# Patient Record
Sex: MALE | Race: WHITE | Employment: OTHER | URBAN - METROPOLITAN AREA
[De-identification: names, ages, dates, MRNs, and addresses within clinical notes are randomized per-mention and may not be internally consistent; named-entity substitution may affect disease eponyms.]

---

## 2017-05-12 PROCEDURE — 84153 ASSAY OF PSA TOTAL: CPT | Performed by: INTERNAL MEDICINE

## 2017-05-24 PROBLEM — I27.20 PULMONARY HTN (HCC): Status: ACTIVE | Noted: 2017-05-24

## 2017-07-18 PROBLEM — G40.209 PARTIAL SYMPTOMATIC EPILEPSY WITH COMPLEX PARTIAL SEIZURES, NOT INTRACTABLE, WITHOUT STATUS EPILEPTICUS (HCC): Status: ACTIVE | Noted: 2017-07-18

## 2018-02-28 PROBLEM — F32.0 MILD SINGLE CURRENT EPISODE OF MAJOR DEPRESSIVE DISORDER (HCC): Status: ACTIVE | Noted: 2018-02-28

## 2018-03-07 PROBLEM — E88.09 HYPOALBUMINEMIA DUE TO PROTEIN-CALORIE MALNUTRITION (HCC): Status: ACTIVE | Noted: 2018-03-07

## 2018-03-07 PROBLEM — E46 HYPOALBUMINEMIA DUE TO PROTEIN-CALORIE MALNUTRITION (HCC): Status: ACTIVE | Noted: 2018-03-07

## 2018-03-07 PROCEDURE — 84153 ASSAY OF PSA TOTAL: CPT | Performed by: INTERNAL MEDICINE

## 2019-03-04 PROBLEM — E88.09 HYPOALBUMINEMIA DUE TO PROTEIN-CALORIE MALNUTRITION (HCC): Status: RESOLVED | Noted: 2018-03-07 | Resolved: 2019-03-04

## 2019-03-04 PROBLEM — E46 HYPOALBUMINEMIA DUE TO PROTEIN-CALORIE MALNUTRITION (HCC): Status: RESOLVED | Noted: 2018-03-07 | Resolved: 2019-03-04

## 2019-04-04 PROCEDURE — 81003 URINALYSIS AUTO W/O SCOPE: CPT | Performed by: INTERNAL MEDICINE

## 2019-05-31 PROBLEM — N18.30 CKD (CHRONIC KIDNEY DISEASE) STAGE 3, GFR 30-59 ML/MIN (HCC): Status: ACTIVE | Noted: 2019-05-31

## 2019-06-05 PROCEDURE — 88305 TISSUE EXAM BY PATHOLOGIST: CPT | Performed by: INTERNAL MEDICINE

## 2019-10-09 PROBLEM — F01.50 VASCULAR DEMENTIA WITHOUT BEHAVIORAL DISTURBANCE (HCC): Status: ACTIVE | Noted: 2019-10-09

## 2019-10-14 PROBLEM — D03.4 MELANOMA IN SITU OF NECK (HCC): Status: ACTIVE | Noted: 2019-10-14

## 2020-01-23 ENCOUNTER — HOSPITAL ENCOUNTER (OUTPATIENT)
Facility: HOSPITAL | Age: 75
Setting detail: OBSERVATION
Discharge: NURSING, REHAB OR LONG-TERM CARE | End: 2020-01-30
Attending: EMERGENCY MEDICINE | Admitting: INTERNAL MEDICINE
Payer: MEDICARE

## 2020-01-23 ENCOUNTER — APPOINTMENT (OUTPATIENT)
Dept: MRI IMAGING | Facility: HOSPITAL | Age: 75
End: 2020-01-23
Attending: EMERGENCY MEDICINE
Payer: MEDICARE

## 2020-01-23 DIAGNOSIS — Z91.81 AT HIGH RISK FOR FALLS: ICD-10-CM

## 2020-01-23 DIAGNOSIS — R26.81 UNSTEADY GAIT: Primary | ICD-10-CM

## 2020-01-23 PROCEDURE — 80053 COMPREHEN METABOLIC PANEL: CPT | Performed by: EMERGENCY MEDICINE

## 2020-01-23 PROCEDURE — 85025 COMPLETE CBC W/AUTO DIFF WBC: CPT | Performed by: EMERGENCY MEDICINE

## 2020-01-23 PROCEDURE — 99285 EMERGENCY DEPT VISIT HI MDM: CPT

## 2020-01-23 PROCEDURE — 86235 NUCLEAR ANTIGEN ANTIBODY: CPT | Performed by: OTHER

## 2020-01-23 PROCEDURE — 86618 LYME DISEASE ANTIBODY: CPT | Performed by: OTHER

## 2020-01-23 PROCEDURE — 93005 ELECTROCARDIOGRAM TRACING: CPT

## 2020-01-23 PROCEDURE — 70551 MRI BRAIN STEM W/O DYE: CPT | Performed by: EMERGENCY MEDICINE

## 2020-01-23 PROCEDURE — 83921 ORGANIC ACID SINGLE QUANT: CPT | Performed by: OTHER

## 2020-01-23 PROCEDURE — 86038 ANTINUCLEAR ANTIBODIES: CPT | Performed by: OTHER

## 2020-01-23 PROCEDURE — 82550 ASSAY OF CK (CPK): CPT | Performed by: OTHER

## 2020-01-23 PROCEDURE — 82607 VITAMIN B-12: CPT | Performed by: OTHER

## 2020-01-23 PROCEDURE — 81003 URINALYSIS AUTO W/O SCOPE: CPT | Performed by: EMERGENCY MEDICINE

## 2020-01-23 PROCEDURE — 93010 ELECTROCARDIOGRAM REPORT: CPT

## 2020-01-23 PROCEDURE — 86225 DNA ANTIBODY NATIVE: CPT | Performed by: OTHER

## 2020-01-23 PROCEDURE — 36415 COLL VENOUS BLD VENIPUNCTURE: CPT

## 2020-01-23 RX ORDER — HEPARIN SODIUM 5000 [USP'U]/ML
5000 INJECTION, SOLUTION INTRAVENOUS; SUBCUTANEOUS EVERY 8 HOURS SCHEDULED
Status: DISCONTINUED | OUTPATIENT
Start: 2020-01-23 | End: 2020-01-30

## 2020-01-23 RX ORDER — ACETAMINOPHEN 325 MG/1
650 TABLET ORAL EVERY 6 HOURS PRN
Status: DISCONTINUED | OUTPATIENT
Start: 2020-01-23 | End: 2020-01-30

## 2020-01-23 NOTE — ED INITIAL ASSESSMENT (HPI)
Pt with difficulty walking last 2 weeks, unsteady on feet. Deny fall. Pt had history of stroke 9 years ago.

## 2020-01-24 PROCEDURE — 97162 PT EVAL MOD COMPLEX 30 MIN: CPT

## 2020-01-24 PROCEDURE — 83921 ORGANIC ACID SINGLE QUANT: CPT | Performed by: OTHER

## 2020-01-24 PROCEDURE — 97116 GAIT TRAINING THERAPY: CPT

## 2020-01-24 PROCEDURE — 85652 RBC SED RATE AUTOMATED: CPT | Performed by: OTHER

## 2020-01-24 RX ORDER — LOSARTAN POTASSIUM 100 MG/1
100 TABLET ORAL DAILY
Status: DISCONTINUED | OUTPATIENT
Start: 2020-01-24 | End: 2020-01-30

## 2020-01-24 RX ORDER — ASPIRIN 81 MG/1
81 TABLET ORAL DAILY
Status: DISCONTINUED | OUTPATIENT
Start: 2020-01-24 | End: 2020-01-30

## 2020-01-24 NOTE — H&P
[facility] Hospitalist History and Physical      Patient presents with:  Neurologic Problem       PCP: Henok Kruger MD      History of Present Illness: Patient is a 69year old male with PMH sig for CVA with residual L sided weakness, vascular dementia,  HTN, [address]   • MELIOLABIAL  FLAP CLOSURE Left 5/14/2013    Performed by Rebecca Lin MD at [address] [facility] AND SCULPTING N/A 10/1/2013    Performed by Rebecca Lin MD at [address] [place] [address] °C) (Oral)   Resp 18   Ht 6' 2\" (1.88 m)   Wt 200 lb (90.7 kg)   SpO2 94%   BMI 25.68 kg/m²   Gen: No acute distress, alert and oriented x3, no focal neurologic deficits  HEENT:  EOMI, PERRLA, OP clear, MMM  Pulm: Lungs clear bilaterally, normal respirato occipital lobe consistent with prior infarct/insult with parenchymal loss. There is corresponding ex vacuo dilatation of the occipital horn of the lateral ventricle. Expected signal is seen in the visualized paranasal sinuses and mastoid air cells.

## 2020-01-24 NOTE — PLAN OF CARE
Pt admitted to room 7603. With wife and son at bedside. All questions answered plan of care reviewed. Vss, pt voiding, neuro consult for tomorrow as well as PT.  Will continue to follow

## 2020-01-24 NOTE — PLAN OF CARE
Problem: Patient/Family Goals  Goal: Patient/Family Long Term Goal  Description  Patient's Long Term Goal:     Interventions:  - See additional Care Plan goals for specific interventions  Outcome: Progressing  Goal: Patient/Family Short Term Goal  Descri and encourage patient/family in tolerated functional activity level and precautions during self-care  Outcome: Progressing     Problem: SAFETY ADULT - FALL  Goal: Free from fall injury  Description  INTERVENTIONS:  - Assess pt frequently for physical needs

## 2020-01-24 NOTE — ED PROVIDER NOTES
Patient Seen in: [facility] Emergency Department      History   Patient presents with:  Neurologic Problem    Stated Complaint: gait issues for several weeks, no mentation issues    HPI    The patient is a 46-year-old male with a history of a CVA ab POSSIBLE POLYPECTOMY 74695 N/A 2/27/2009    Performed by Hadley Snellen, MD at [address] GRAFT Left 5/14/2013    Performed by Juan Celaya MD at [address] [facility] Left 5/14/20 slurred speech. CN II-XII intact. Grossly normal and symmetric motor strength and sensation proximally and distally throughout 4 extremities.   Patient has a very unstable gait, as soon as I stand him up, he only is able to make 1 or 2 strides, dragging hi DIFFERENTIAL WITH PLATELET    Narrative: The following orders were created for panel order CBC WITH DIFFERENTIAL WITH PLATELET.   Procedure                               Abnormality         Status                     --------- basilar tip. There is high FLAIR and T2 weighted signal in deep white matter     consistent with moderate chronic small vessel disease. There is no     restricted diffusion.            There is an area of signal change involving the right occipit

## 2020-01-24 NOTE — CM/SW NOTE
Pt is a 68 yo male admitted for unsteady gait. Pt lives with his wife Iesha Mccarthy in a two-story home. He has 4 children - one dtr living locally. Pt has been walking with a walker. Pt has some memory deficits. Pt has a history of a stroke 9 years ago.   Pt

## 2020-01-24 NOTE — CONSULTS
[facility]  Neurology  Hospital Consultation Report    Moisés Urbina Patient Status:  Observation    1945 MRN [id number]   [facility] 7NE-A Attending Bishnu Brock, DO   Hosp Day # 0 PCP Andi Ely MD   Date of Adm 1/07 0.8; 6/05 0.7   • Mild single current episode of major depressive disorder ([facility] [address] 2/28/2018   • Other and unspecified hyperlipidemia    • STROKE 1/11    loss of left peripheral vision   • Unspecified essential hypertension    • Vascular dementia withou Smokeless tobacco: Never Used      Tobacco comment: smoked as a teenager    Alcohol use:  Yes      Alcohol/week: 0.0 standard drinks      Comment: occasional wine    Drug use: No         Current Medications:  aspirin EC tab 81 mg, 81 mg, Oral, Daily  chela bright and well composed. Head: Normocephalic, atraumatic. Eyes: anicteric  ENT: normal tongue, normal mucosa. Neck: supple  Lymph Nodes:  No adenopathy  Cardiovascular:   Regular rate and rhythm. Widened QRS on telemetry.   Skin: No evidence for rash Dictated by: Barbara Anderson MD on 1/23/2020 at 18:57     Approved by: Barbara Anderson MD on 1/23/2020 at 19:00          Pertinent Labs and Imaging: Reviewed.     Impression:     Unsteady gait  Likely multifactorial.  The patient has moderately severe small vessel were answered. Understanding of the information was demonstrated. Further recommendations and impressions will follow repeat examinations and review of pertinent data.  The reader is asked to contact this examiner following review the report question or

## 2020-01-24 NOTE — CONSULTS
Chart reviewed  Seen previously by Dr. Clarice Garza. No DWI abnormalities on MRI to suggest acute stroke. Chronic and severe cerebrovascular disease found on MRI brain. Blood testing added.   Recommend EMG  PT ordered  Full note to follow    Michelle Johnston MD

## 2020-01-24 NOTE — CONSULTS
Carlyle Patient Status:  Observation    1945 MRN [id number]   [facility] 7NE-A Attending Troy Almonte DO   Hosp Day # 0 PCP Edith Shelton MD     Patient Identification  Rohan Buckley is a 69 year ol They are recommending additional work-up as an outpatient. He was seen by physical therapy and unsteady with gait thus rehab consult requested. Physiatry consult obtained now to assess pt's funtional status and make appropriate recommendations.     Pavan MELIOLABIAL  FLAP CLOSURE Left 5/14/2013    Performed by Mode Wynn MD at [address] [facility] AND SCULPTING N/A 10/1/2013    Performed by Mode Wynn MD at [facility] 1623 01/24/2020     01/23/2020    B12 333 01/23/2020       Review of Systems:  Complete review systems done and negative except as that outlined in HPI.   He somewhat unreliable for review of systems given cognitive impairment    OBJECTIVE:    Blood pressu Psychiatric: Awake, alert and oriented x 2. Able to register and recall 0/3                                       items. Flat affect. Delayed cognitive processing. Hard of hearing. He does have a left visual field cut as well.   There

## 2020-01-24 NOTE — PHYSICAL THERAPY NOTE
PHYSICAL THERAPY EVALUATION - INPATIENT     Room Number: 8340/8754-A  Evaluation Date: 1/24/2020  Type of Evaluation: Initial  Physician Order: PT Eval and Treat    Presenting Problem: worsening gait instability  Reason for Therapy: Mobility Dysfunct COLONOSCOPY, POSSIBLE BIOPSY, POSSIBLE POLYPECTOMY 39888 N/A 7/16/2014    Performed by Kb Anne MD at [place] [address]., POSSIBLE POLYPECTOMY 01387 N/A 2/27/2009    Performed by Kb Anne MD at [facility] vision(left visual field cut)  Fall Risk: High fall risk    WEIGHT BEARING RESTRICTION  Weight Bearing Restriction: None                PAIN ASSESSMENT  Ratin  Location: denies pain       COGNITION  · Safety Judgement:  decreased awareness of need for Device: Rolling walker  Pattern: L Decreased stance time; Shuffle;L Foot drag;Ataxic  Stoop/Curb Assistance: Not tested  Comment : Score based on FIM definitions per department protocol. Skilled Therapy Provided:   Session approved by RN.  Pt received sup wife, HTN, vascular dementia. In this PT evaluation, the patient presents with the following impairments decreased activity tolerance, decreased functional strength, impaired balance, impaired motor planning, impaired safety awareness with rolling walker.

## 2020-01-25 PROCEDURE — 84425 ASSAY OF VITAMIN B-1: CPT | Performed by: OTHER

## 2020-01-25 PROCEDURE — 85014 HEMATOCRIT: CPT | Performed by: OTHER

## 2020-01-25 PROCEDURE — 82747 ASSAY OF FOLIC ACID RBC: CPT | Performed by: OTHER

## 2020-01-25 PROCEDURE — 97116 GAIT TRAINING THERAPY: CPT

## 2020-01-25 PROCEDURE — 97112 NEUROMUSCULAR REEDUCATION: CPT

## 2020-01-25 PROCEDURE — 97110 THERAPEUTIC EXERCISES: CPT

## 2020-01-25 NOTE — CM/SW NOTE
Spoke with spouse regarding ODETTE. [facility] and [facility] @  per spouse request.  Explained two step process-clinical acceptance in network with insurance and then insurance auth.

## 2020-01-25 NOTE — PLAN OF CARE
Assumed care at 0700. Pt A/O x3. [place]. RA. NSR on tele. VSS. L field cut and L sided ataxia noted. Denies any pain. Rheumatology (Dr. Lesia Oropeza) paged with new consult. Up x2 with the walker and gait belt.  Walked in the wolff x1 with nursing staff and then with P activity/tolerance for mobility and gait  - Educate and engage patient/family in tolerated activity level and precautions  - Recommend use of  RW for transfers and ambulation   Outcome: Progressing     Problem: Impaired Activities of Daily Living  Goal: Ac Department for coordinating discharge planning if the patient needs post-hospital services based on physician/LIP order or complex needs related to functional status, cognitive ability or social support system  Outcome: Progressing     Problem: Salvatore Pabon

## 2020-01-25 NOTE — PHYSICAL THERAPY NOTE
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 Skilled Therapy Provided: The pt participated in coordination training of LLE, strength training with dat le's, balance training to improve dynamic balance, transfer training and gait training.  Pt's wife was educated on coordination ex to [employer] rehabilitation     PLAN  PT Treatment Plan: Bed mobility; Coordination; Endurance; Energy conservation;Patient education; Family education;Gait training;Neuromuscular re-educate;Stair training;Transfer training;Balance training  Rehab Potential : Alesha Shannon

## 2020-01-25 NOTE — PLAN OF CARE
Pt care assumed this am  Awake, alert and oriented x 3- disoriented to year and date. Baseline- Hard of hearing with left visual field cut.  strength 5/5 upper arms with some ataxic discordinated movement with left finger-nose and left lower extremity.   Ev

## 2020-01-25 NOTE — CM/SW NOTE
Patient seen by dr Meena Murphy with acute rehab--recommendation of ODETTE noted--to follow up with patient

## 2020-01-25 NOTE — PROGRESS NOTES
Chad Lynch Patient Status:  Observation    1945 MRN [id number]   [facility] 7NE-A Attending Debbie Soler DO   Hosp Day # 0 PCP Christine Patterson MD   Date of Admissi [address] [facility] [address]   • EAR CARTILAGE HARVEST GRAFT Left 5/14/2013    Performed by Emmanuel Browne MD at [address]   • [facility] Left 5/14/2013    Performed by Emmanuel Browne MD at [address] dosage unknown  COQ10 OR, dosage unknown      Allergies    Pcn [Bicillin L-A]          Comment:Does not know reaction  Review of Systems:   Review of Systems:  GENERAL: feels well otherwise  SKIN: denies any unusual skin lesions or rashes  EYES: denies scott diminished bilaterally, some neglect of the left. Coordination:  There is no cerebellar dystaxia, although the arms are clumsy.     Results:   Laboratory Data:  Lab Results   Component Value Date    WBC 6.9 01/23/2020    HGB 14.3 01/23/2020    HCT 41.8 01/ prophylaxis. This will be deferred to the hospitalist team and primary care physician. At high risk for falls  As above. Risk will continue.       Positive autoantibodies  Per hospitalist team    I've explained these findings and impressions to the pa

## 2020-01-25 NOTE — PROGRESS NOTES
[facility] Hospitalist Progress Note                                                                   Natalie  5/18/1945    SUBJECTIVE:  Pt seen and examined.   No new complaints, denies annie recommend ODETTE  - unclear significance of \"ex vacuo dilation of the occipital horn of the lateral ventrical.\"  Symptoms unlikely due hydrocephalus per neuro.    - await decision about ODETTE placement     #Hx of CVA with residual L sided weakness  - cont baby

## 2020-01-25 NOTE — PLAN OF CARE
Assumed care of pt 1930. Spouse and son at bedside. Aox3, unsure of date. SR per tele. Up x1 assist with walker. Ambulated in wolff. Gait unsteady. Shuffling gait. Denies pain. POC updated with pt and family. Sleeping comfortably, will continue to monitor. and precautions  - Recommend use of  RW for transfers and ambulation   Outcome: Progressing     Problem: Impaired Activities of Daily Living  Goal: Achieve highest/safest level of independence in self care  Description  Interventions:  - Assess ability and order or complex needs related to functional status, cognitive ability or social support system  Outcome: Progressing     Problem: NEUROLOGICAL - ADULT  Goal: Achieves stable or improved neurological status  Description  INTERVENTIONS  - Assess for and rep

## 2020-01-25 NOTE — CONSULTS
Consult Requested By:  Dr. Lopez ref.  provider found                Chief Complaint:  Patient presents with:  Neurologic Problem              HPI: The patient, a 69year old male with a PMH of stroke with left sided weakness, vascular dementia, HTN, major dep POSSIBLE BIOPSY, POSSIBLE POLYPECTOMY 36667 N/A 7/16/2014    Performed by Zenia Harman MD at [place] [address]., POSSIBLE POLYPECTOMY 46949 N/A 2/27/2009    Performed by Zenia Harman MD at [address] [place] [address] distress  Head: Normocephalic   Eyes:  sclera-clear  NMT: mucus membranes moist  Neck:  No adenopathy     CVS: S1+S2+, RRR   Chest: Clear to auscultation. No crackles, rhonchi or wheezes.   Abd:    Ext: no edema, cyanosis or clubbing   Skin: Unremarkable CRP  - his ESR, UA, CBC/CMP and UA with just mild abnormalities that are nonspecific  - check Aldolase    2. Gait instability- no proximal weakness on exam.  CK negative.    - will await NCS results  - brain MRI no new ischemic disease   - given normal CK a

## 2020-01-26 PROCEDURE — 97166 OT EVAL MOD COMPLEX 45 MIN: CPT

## 2020-01-26 PROCEDURE — 82085 ASSAY OF ALDOLASE: CPT | Performed by: INTERNAL MEDICINE

## 2020-01-26 PROCEDURE — 86160 COMPLEMENT ANTIGEN: CPT | Performed by: INTERNAL MEDICINE

## 2020-01-26 PROCEDURE — 86140 C-REACTIVE PROTEIN: CPT | Performed by: INTERNAL MEDICINE

## 2020-01-26 PROCEDURE — 80061 LIPID PANEL: CPT | Performed by: HOSPITALIST

## 2020-01-26 PROCEDURE — 97530 THERAPEUTIC ACTIVITIES: CPT

## 2020-01-26 NOTE — OCCUPATIONAL THERAPY NOTE
OCCUPATIONAL THERAPY EVALUATION - INPATIENT     Room Number: 3076/3905-V  Evaluation Date: 1/26/2020  Type of Evaluation: Initial  Presenting Problem: Unsteady gait    Physician Order: IP Consult to Occupational Therapy  Reason for Therapy: ADL/IADL Dysfun COLONOSCOPY, POSSIBLE BIOPSY, POSSIBLE POLYPECTOMY 60484 N/A 7/16/2014    Performed by Leroy Pandya MD at [place] [address] POSSIBLE POLYPECTOMY 49034 N/A 2/27/2009    Performed by Leroy Pandya MD at [facility] ASSESSMENT  Ratin          COGNITION  Arousal/Alertness:  lethargic  Attention Span:  attends with cues to redirect  Motor Planning: impaired    VISION  Peripheral:  impaired        RANGE OF MOTION AND STRENGTH ASSESSMENT  Upper extremity ROM and stren completed include IP AMPAC, MMT and ROM.  In this OT evaluation patient presents with the following performance deficits: decreased strength and balance, decreased endurance, decreased knowledge of adaptive techniques, decreased knowledge of safety precauti with supervision and while at edge of bed  Patient will perform toileting: with supervision    Functional Transfer Goals  Patient will transfer from sit to stand:  with supervision  Patient will transfer to toilet:  with supervision

## 2020-01-26 NOTE — PROGRESS NOTES
Chart reviewed. If patient is still in the hospital tomorrow, and if available, there may be some benefit in having electrodiagnostic testing done as an inpatient.   This will be provided by the [facility] neurology team.  Otherwise, the patient may go to [facility]

## 2020-01-26 NOTE — PROGRESS NOTES
[facility] Hospitalist Progress Note                                                                   Natalie  5/18/1945    SUBJECTIVE:  Pt seen and examined.   No new complaints, denies annie EMG   - PT and PMR eval appreciated, recommend ODETTE  - unclear significance of \"ex vacuo dilation of the occipital horn of the lateral ventrical.\"  Symptoms unlikely due hydrocephalus per neuro.    - await decision about ODETTE placement     #Positive KENAN  -

## 2020-01-26 NOTE — PLAN OF CARE
Assumed care at 0700. Pt A/O x3. [place]. RA. NSR on tele. VSS. L field cut and L sided ataxia noted. Denies any pain. Up x2 with the walker and gait belt. Walked in the wolff with nursing staff x1. Pt and pt family updated on POC. Call light within reach.  Will tolerated activity level and precautions  - Recommend use of  RW for transfers and ambulation   Outcome: Progressing     Problem: Impaired Activities of Daily Living  Goal: Achieve highest/safest level of independence in self care  Description  Interventio services based on physician/LIP order or complex needs related to functional status, cognitive ability or social support system  Outcome: Progressing     Problem: NEUROLOGICAL - ADULT  Goal: Achieves stable or improved neurological status  Description  INT

## 2020-01-26 NOTE — PLAN OF CARE
Assumed care of pt 1930. Aox3. Ambulated in hallway x2 assist with walker and gait belt. Gait unsteady. Denies pain. SR per tele. RA, lungs clear. Sleeping comfortably, will continue to monitor.       Problem: Patient/Family Goals  Goal: Patient/Family Long ambulation   Outcome: Progressing     Problem: Impaired Activities of Daily Living  Goal: Achieve highest/safest level of independence in self care  Description  Interventions:  - Assess ability and encourage patient to participate in ADLs to maximize func cognitive ability or social support system  Outcome: Progressing     Problem: NEUROLOGICAL - ADULT  Goal: Achieves stable or improved neurological status  Description  INTERVENTIONS  - Assess for and report changes in neurological status  - Initiate measur

## 2020-01-27 PROCEDURE — 97116 GAIT TRAINING THERAPY: CPT

## 2020-01-27 PROCEDURE — 80048 BASIC METABOLIC PNL TOTAL CA: CPT | Performed by: HOSPITALIST

## 2020-01-27 PROCEDURE — 85025 COMPLETE CBC W/AUTO DIFF WBC: CPT | Performed by: HOSPITALIST

## 2020-01-27 PROCEDURE — 97112 NEUROMUSCULAR REEDUCATION: CPT

## 2020-01-27 NOTE — PLAN OF CARE
Assumed care at [address] and oriented x4, forgetful  L sided weakness, L field cut & L sided ataxia noted, residual from old CVA  Currently on RA, spO2 90's  Denies pain at this time  Plan is to find ODETTE placement, multiple referrals already sent  Also, activity/tolerance for mobility and gait  - Educate and engage patient/family in tolerated activity level and precautions  - Recommend use of  RW for transfers and ambulation   Outcome: Progressing     Problem: Impaired Activities of Daily Living  Goal: Ac Department for coordinating discharge planning if the patient needs post-hospital services based on physician/LIP order or complex needs related to functional status, cognitive ability or social support system  Outcome: Progressing     Problem: James Beltran

## 2020-01-27 NOTE — PHYSICAL THERAPY NOTE
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 Skilled Therapy Provided: The pt participated in coordination training of LLE, strength training with dat le's, balance training to improve dynamic balance, transfer training and gait training.    Pt ambulated with RW and min assist of one, mod Daily    CURRENT GOALS        Goal #1 Patient is able to demonstrate supine - sit EOB @ level: modified independent      Goal #2 Patient is able to demonstrate transfers Sit to/from Stand at assistance level: modified independent      Goal #3 Patient is ab

## 2020-01-27 NOTE — PROGRESS NOTES
[facility] hospitalist daily note  Patient was seen/examined on 1/27/20    S; no chest pain, no SOB, no abd pain, no nausea/emesis  No bleeding    Medications in Epic    PE    01/27/20  1200   BP:    Pulse:    Resp:    Temp: 98.1 °F (36.7 °C)     Gen: awake, aler

## 2020-01-27 NOTE — CM/SW NOTE
[facility] can accept pt medically; however, wife has decided she would like pt to go to [facility] for ODETTE. Called and asked Steffi Jules at [facility] to submit for insurance auth. SW to f/u with pt's d/c to [employer] auth.

## 2020-01-27 NOTE — PROGRESS NOTES
Subjective     No overnight events    • aspirin  81 mg Oral Daily   • Losartan Potassium  100 mg Oral Daily   • Sertraline HCl  50 mg Oral Nightly   • Heparin Sodium (Porcine)  5,000 Units Subcutaneous Q8H [address]     Objective     /81 (BP Location: Left

## 2020-01-27 NOTE — CM/SW NOTE
[facility] pt for [facility], [facility] cannot accept, have not heard back from [facility] or [facility]. Wife would like an additional referral sent to [facility] in [place] since it is close to their home.   Referral sent via ecin - waiting for a respon

## 2020-01-27 NOTE — PLAN OF CARE
Assumed care of patient at 0700  A/OX3-4, RA, NSR on tele  Denies pain  PT rec acute rehab  Patient unable to receive EMG today d/t no [facility] neurologist available.  Patient and family agreed to EMG outpatient  Patient and family decided on [place] for rehab

## 2020-01-28 PROCEDURE — 97116 GAIT TRAINING THERAPY: CPT

## 2020-01-28 PROCEDURE — 97535 SELF CARE MNGMENT TRAINING: CPT

## 2020-01-28 PROCEDURE — 97110 THERAPEUTIC EXERCISES: CPT

## 2020-01-28 RX ORDER — ATORVASTATIN CALCIUM 10 MG/1
10 TABLET, FILM COATED ORAL NIGHTLY
Status: DISCONTINUED | OUTPATIENT
Start: 2020-01-28 | End: 2020-01-30

## 2020-01-28 NOTE — PHYSICAL THERAPY NOTE
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 Skilled Therapy Provided: The pt participated in coordination training of LLE, strength training with dat le's, balance training to improve dynamic balance, transfer training and gait training.    Pt ambulated with RW and min assist of one for mobility; Coordination; Endurance; Energy conservation;Patient education; Family education;Gait training;Neuromuscular re-educate;Stair training;Transfer training;Balance training  Rehab Potential : Good  Frequency (Obs): Daily    CURRENT GOALS        Goal #1

## 2020-01-28 NOTE — OCCUPATIONAL THERAPY NOTE
OCCUPATIONAL THERAPY TREATMENT NOTE - INPATIENT     Room Number: 5465/4422-Z  Session: 1   Number of Visits to Meet Established Goals: 5    Presenting Problem: Unsteady gait    History related to current admission:  Pt is 69year old male admitted on 1/23/ [facility]   • COLONOSCOPY, POSSIBLE BIOPSY, POSSIBLE POLYPECTOMY 39307 N/A 2/27/2009    Performed by Chloé Castro MD at [address] [facility] [address] GRAFT Left 5/14/2013    Performed by Nathalia Wright MD at [place] [address] Pt was completing PT session. When standing with RW, min A for dynamic balance, but without R hand support on the walker, pt could not weight shift R LE off the floor. Refer to PT note. L LE weakness noted.   Per pt, low vision since the old CVA, but he

## 2020-01-28 NOTE — PLAN OF CARE
Assumed care at 0700. Pt A/O x2-3. Neurologically stable. RA. NSR on tele. VSS. Denies any pain. Up x1 with the walker and a gait belt. Waiting on insurance approval for discharge to [address] [place] [address]. Pt and pt family updated on POC. Call light within reach.  Will tolerated activity level and precautions  - Recommend use of  RW for transfers and ambulation   Outcome: Progressing     Problem: Impaired Activities of Daily Living  Goal: Achieve highest/safest level of independence in self care  Description  [facility] services based on physician/LIP order or complex needs related to functional status, cognitive ability or social support system  Outcome: Progressing     Problem: NEUROLOGICAL - ADULT  Goal: Achieves stable or improved neurological status  Description  INT

## 2020-01-28 NOTE — CM/SW NOTE
Per [facility], they have not ye received ins auth. [facility] is not managing this referral auth.  They transferred the call to [address] [place] [address] but was unable to reach a department or a human who could [address] [facility]

## 2020-01-29 PROCEDURE — 97116 GAIT TRAINING THERAPY: CPT

## 2020-01-29 PROCEDURE — 97110 THERAPEUTIC EXERCISES: CPT

## 2020-01-29 PROCEDURE — 97535 SELF CARE MNGMENT TRAINING: CPT

## 2020-01-29 RX ORDER — ATORVASTATIN CALCIUM 10 MG/1
10 TABLET, FILM COATED ORAL NIGHTLY
Qty: 30 TABLET | Refills: 0 | Status: SHIPPED | OUTPATIENT
Start: 2020-01-29 | End: 2020-06-04

## 2020-01-29 NOTE — PROGRESS NOTES
[facility] hospitalist daily note  Patient was seen/examined on 1/28/20     S; no chest pain, no SOB, no abd pain, no nausea/emesis  No bleeding     Medications in Epic     PE    01/28/20  1643   BP: 110/56   Pulse: 70   Resp: 18   Temp: 97.9 °F (36.6 °C)     [facility]  [phone]

## 2020-01-29 NOTE — OCCUPATIONAL THERAPY NOTE
OCCUPATIONAL THERAPY TREATMENT NOTE - INPATIENT     Room Number: 8664/9852-O  Session: 2   Number of Visits to Meet Established Goals: 5    Presenting Problem: Unsteady gait    History related to current admission:  Pt is 69year old male admitted on 1/23/ [facility], [facility]   • COLONOSCOPY, POSSIBLE BIOPSY, POSSIBLE POLYPECTOMY [address]76 N/A 2/27/2009    Performed by Kike Don MD at [address] [place] [address] GRAFT Left 5/14/2013    Performed by Shahnaz Zeng MD at [facility] ASSESSMENT  Supine to Sit : Not tested  Sit to Stand: Minimum assistance    Skilled Therapy Provided: Pt was working with PT. Min A to stand and to ambulate to sink counter. Required increased time and tactile cueing to advance L LE.   Pt completed facial dressing:  with supervision and while at edge of bed  Patient will perform toileting: with supervision     Functional Transfer Goals  Patient will transfer from sit to stand:  with supervision  Patient will transfer to toilet:  with supervision

## 2020-01-29 NOTE — DISCHARGE SUMMARY
General Medicine Discharge Summary     Patient ID:  Lucy Steiner  69year old  5/18/1945    Admit date: 1/23/2020    Discharge date and time: 1/29/20    Attending Physician: Jordan Mirza MD     Primary Care Physician: Ju Chamberlain MD     Reason fo mg total) by mouth daily. Sertraline HCl 50 MG Oral Tab  Take 1 tablet (50 mg total) by mouth daily. aspirin 81 MG Oral Tab  Take 81 mg by mouth daily.     SM OMEGA-3-6-9 FATTY ACIDS OR  dosage unknown    COQ10 OR  dosage unknown      STOP taking thes

## 2020-01-29 NOTE — PLAN OF CARE
Assumed care 1900. Pt A&Ox3. VSS. Neuros q shift obtained. No acute events overnight. Denies pain. Up ambulating in halls and bathroom with walker and 1 assist. Tolerated fairly well. Pt updated on POC. Monitoring.    Waiting for insurance approval

## 2020-01-29 NOTE — PHYSICAL THERAPY NOTE
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 Skilled Therapy Provided: The pt participated on endurance training ( stationary restorator x 9 min), strength training with dat heather's, balance training to improve dynamic balance, transfer training and gait training.  Pt able to stand with clos training;Neuromuscular re-educate;Stair training;Transfer training;Balance training  Rehab Potential : Good  Frequency (Obs): Daily    CURRENT GOALS        Goal #1 Patient is able to demonstrate supine - sit EOB @ level: modified independent      Goal #2 P

## 2020-01-29 NOTE — DIETARY NOTE
[address]     Admitting diagnosis:  Unsteady gait [R26.81]  At high risk for falls [Z91.81]    Ht: 188 cm (6' 2\")  Wt: 86.6 kg (191 lb). This is 100 % of IBW  Body mass index is 24.52 kg/m².   IBW: 86.4 kg    La

## 2020-01-29 NOTE — CM/SW NOTE
Jerome Rocha still does not have auth from the insurance medical director.  Rosa Maria Santana is in the office until just after 4pm. She does not have the information about who may be in the office after 4pm.  Spoke with 2 other representatives and finally LM for manager, Natalie Malagon

## 2020-01-29 NOTE — PROGRESS NOTES
Letter sent to MA pool with results and MD notes  Nothing further needed from MD or nurse. Closing encounter.

## 2020-01-29 NOTE — PLAN OF CARE
Assumed care at 0700. Pt A/O x3. RA. NSR on tele. VSS. All consults cleared for discharge. Waiting for insurance approval for rehab. Plan to go to either [facility] or [facility]. Probably d/c tomorrow. Pt and pt family updated on POC. Call light within reach. stability  - Promote increasing activity/tolerance for mobility and gait  - Educate and engage patient/family in tolerated activity level and precautions  - Recommend use of  RW for transfers and ambulation   Outcome: Adequate for Discharge     Problem: Im responsible for managing their own health  - Refer to Case Management Department for coordinating discharge planning if the patient needs post-hospital services based on physician/LIP order or complex needs related to functional status, cognitive ability o

## 2020-01-29 NOTE — CM/SW NOTE
[facility] take [facility] pt today. They both have bed availability. We are now just waiting for Alisha to give auth for [facility]ETTE vides. Family will provide transportation via private car.      / to remain [employer]

## 2020-01-29 NOTE — CM/SW NOTE
Elizabeth García is the entity that manages the auth for this pt to go to [facility]. [facility] is not a preferred provider. Elizabeth García / Sanjuana Orta [phone] is having the medical directory review this case to request auth for ODETTE and check if the pt can get auth go to [facility].

## 2020-01-30 VITALS
HEIGHT: 74 IN | SYSTOLIC BLOOD PRESSURE: 129 MMHG | TEMPERATURE: 98 F | OXYGEN SATURATION: 96 % | HEART RATE: 63 BPM | BODY MASS INDEX: 24.51 KG/M2 | DIASTOLIC BLOOD PRESSURE: 75 MMHG | WEIGHT: 191 LBS | RESPIRATION RATE: 12 BRPM

## 2020-01-30 PROCEDURE — 97110 THERAPEUTIC EXERCISES: CPT

## 2020-01-30 PROCEDURE — 97535 SELF CARE MNGMENT TRAINING: CPT

## 2020-01-30 PROCEDURE — 97116 GAIT TRAINING THERAPY: CPT

## 2020-01-30 NOTE — PLAN OF CARE
NURSING DISCHARGE NOTE    Discharged Rehab facility via Wheelchair. Accompanied by Family member and Spouse  Belongings Taken by patient/family.     Received patient A/Ox3, SHERRY, NSR on tele at 0700  Patient ambulated with PT/OT  Neuro check q shift, see Mobility  Goal: Achieve highest/safest level of mobility/gait  Description  Interventions:  - Assess patient's functional ability and stability  - Promote increasing activity/tolerance for mobility and gait  - Educate and engage patient/family in tolerated post-discharge preferences of patient/family/discharge partner  - Complete POLST form as appropriate  - Assess patient's ability to be responsible for managing their own health  - Refer to Case Management Department for coordinating discharge planning if t

## 2020-01-30 NOTE — CM/SW NOTE
01/30/20 1300   Discharge disposition   Expected discharge disposition Skilled Nurs   Name of Facillity/Home [address] [place] [address]   Patient is Discharged to a [address] [place] [address] Yes   Discharge transportation Private car

## 2020-01-30 NOTE — PLAN OF CARE
Assumed care at [address]. Pt a/ox3, forgetful. VSS. NSR per tele. RA. Denies pain. Assisted to [facility] x1 assist w/ walker. Bed alarm on for safety. Pt updated w/ POC. Will continue to monitor. Pending insurance to [address] [place] [address] or [place].

## 2020-01-30 NOTE — CM/SW NOTE
Spoke with Crista Castro from [facility] to confirm d/c time. Pt's wife will drive pt to [facility] today at 12:30pm.  RN to call report to [phone].

## 2020-01-30 NOTE — DISCHARGE SUMMARY
General Medicine Discharge Summary     Patient ID:  Marissa Dean  69year old  5/18/1945    Admit date: 1/23/2020    Discharge date and time: 1/30/20    Attending Physician: Lavell Mcqueen MD     Primary Care Physician: Jaya Urias MD     Reason fo List    START taking these medications    atorvastatin 10 MG Oral Tab  Take 1 tablet (10 mg total) by mouth nightly. CONTINUE these medications which have NOT CHANGED    Irbesartan 300 MG Oral Tab  Take 1 tablet (300 mg total) by mouth daily.     Dimitriosr

## 2020-01-30 NOTE — CM/SW NOTE
Call placed to miko at [facility]--patient has been approved for [facility] stay at [facility]--updated nurse.   Message left for [facility] to confirm acceptance and room assignment--await call back    Message left for spouse on home phone

## 2020-01-30 NOTE — PHYSICAL THERAPY NOTE
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 Pt tends to overuse right le and ue on walker, difficulty maintaining gait on straight path. Pt shows weakness of left hip abductor and pt relies on RLE and drags left le.  Manual cues and T transfers Sit to/from Stand at assistance level: modified independent      Goal #3 Patient is able to ambulate 150 feet with assist device: walker - rolling at assistance level: supervision      Goal #4 Pt will negotiate one flight of stairs with [place]

## 2020-01-30 NOTE — OCCUPATIONAL THERAPY NOTE
OCCUPATIONAL THERAPY TREATMENT NOTE - INPATIENT     Room Number: 7964/6997-V  Session: 3   Number of Visits to Meet Established Goals: 5    Presenting Problem: Unsteady gait    History related to current admission:  Pt is 69year old male admitted on 1/23/ [facility] [place], [facility]   • COLONOSCOPY, POSSIBLE BIOPSY, POSSIBLE POLYPECTOMY 98006 N/A 2/27/2009    Performed by Vearl Gilford, MD at [address] GRAFT Left 5/14/2013    Performed by Thu Pedraza MD at [place] [address] : Contact guard assist  Sit to Stand: Minimum assistance    Skilled Therapy Provided: CGA supine to sit. Min A to stand. Pt completed B UE and LE AROM, 10 reps each, 6 sets with min cueing. Min A to stand and to ambulate to sink with RW.   Pt started to le

## 2020-03-04 PROBLEM — G62.89 OTHER POLYNEUROPATHY: Status: ACTIVE | Noted: 2020-03-04

## 2020-03-04 PROBLEM — I73.9 SMALL VESSEL DISEASE (HCC): Status: ACTIVE | Noted: 2020-03-04

## 2020-03-04 PROBLEM — I63.50 POSTERIOR CIRCULATION STROKE (HCC): Status: ACTIVE | Noted: 2020-03-04

## 2020-03-23 PROBLEM — I65.03 STENOSIS OF BOTH VERTEBRAL ARTERIES: Status: ACTIVE | Noted: 2020-03-23

## 2020-03-23 PROBLEM — E55.9 VITAMIN D DEFICIENCY: Status: ACTIVE | Noted: 2020-03-23

## 2020-03-23 PROBLEM — D47.2 GAMMOPATHY: Status: ACTIVE | Noted: 2020-03-23

## 2020-03-23 PROBLEM — D03.4 MELANOMA IN SITU OF NECK (HCC): Status: RESOLVED | Noted: 2019-10-14 | Resolved: 2020-03-23

## 2020-03-23 PROBLEM — Z85.820 HISTORY OF MALIGNANT MELANOMA: Status: ACTIVE | Noted: 2020-03-23

## 2020-11-19 PROBLEM — G31.9 CEREBRAL ATROPHY (HCC): Status: ACTIVE | Noted: 2020-11-19

## 2020-11-19 PROBLEM — I63.50 POSTERIOR CIRCULATION STROKE (HCC): Status: RESOLVED | Noted: 2020-03-04 | Resolved: 2020-11-19

## 2020-11-19 PROBLEM — Z86.73 HISTORY OF STROKE: Status: ACTIVE | Noted: 2020-11-19

## 2021-03-24 PROBLEM — G40.209 PARTIAL SYMPTOMATIC EPILEPSY WITH COMPLEX PARTIAL SEIZURES, NOT INTRACTABLE, WITHOUT STATUS EPILEPTICUS (HCC): Status: RESOLVED | Noted: 2017-07-18 | Resolved: 2021-03-24

## 2021-03-24 PROBLEM — I69.354 HEMIPLEGIA AND HEMIPARESIS FOLLOWING CEREBRAL INFARCTION AFFECTING LEFT NON-DOMINANT SIDE (HCC): Status: ACTIVE | Noted: 2021-03-24

## 2021-04-11 DIAGNOSIS — Z23 NEED FOR VACCINATION: ICD-10-CM

## 2023-01-02 ENCOUNTER — HOSPITAL ENCOUNTER (EMERGENCY)
Facility: HOSPITAL | Age: 78
Discharge: NURSING, REHAB OR LONG-TERM CARE | End: 2023-01-04
Attending: EMERGENCY MEDICINE
Payer: MEDICARE

## 2023-01-02 ENCOUNTER — APPOINTMENT (OUTPATIENT)
Dept: CT IMAGING | Facility: HOSPITAL | Age: 78
End: 2023-01-02
Attending: EMERGENCY MEDICINE
Payer: MEDICARE

## 2023-01-02 DIAGNOSIS — F03.911 AGITATION DUE TO DEMENTIA: Primary | ICD-10-CM

## 2023-01-02 DIAGNOSIS — N28.9 RENAL INSUFFICIENCY: ICD-10-CM

## 2023-01-02 LAB
ALBUMIN SERPL-MCNC: 2.9 G/DL (ref 3.4–5)
ALBUMIN/GLOB SERPL: 0.7 {RATIO} (ref 1–2)
ALP LIVER SERPL-CCNC: 105 U/L
ALT SERPL-CCNC: 46 U/L
AMPHET UR QL SCN: NEGATIVE
ANION GAP SERPL CALC-SCNC: 5 MMOL/L (ref 0–18)
APAP SERPL-MCNC: <2 UG/ML (ref 10–30)
AST SERPL-CCNC: 80 U/L (ref 15–37)
ATRIAL RATE: 69 BPM
BASOPHILS # BLD AUTO: 0.03 X10(3) UL (ref 0–0.2)
BASOPHILS NFR BLD AUTO: 0.4 %
BENZODIAZ UR QL SCN: NEGATIVE
BILIRUB SERPL-MCNC: 0.6 MG/DL (ref 0.1–2)
BILIRUB UR QL STRIP.AUTO: NEGATIVE
BUN BLD-MCNC: 42 MG/DL (ref 7–18)
CALCIUM BLD-MCNC: 8.6 MG/DL (ref 8.5–10.1)
CANNABINOIDS UR QL SCN: NEGATIVE
CHLORIDE SERPL-SCNC: 106 MMOL/L (ref 98–112)
CLARITY UR REFRACT.AUTO: CLEAR
CO2 SERPL-SCNC: 27 MMOL/L (ref 21–32)
COCAINE UR QL: NEGATIVE
COLOR UR AUTO: YELLOW
CREAT BLD-MCNC: 2.24 MG/DL
CREAT UR-SCNC: 65.8 MG/DL
EOSINOPHIL # BLD AUTO: 0.26 X10(3) UL (ref 0–0.7)
EOSINOPHIL NFR BLD AUTO: 3.5 %
ERYTHROCYTE [DISTWIDTH] IN BLOOD BY AUTOMATED COUNT: 12.4 %
ETHANOL SERPL-MCNC: <3 MG/DL (ref ?–3)
GFR SERPLBLD BASED ON 1.73 SQ M-ARVRAT: 30 ML/MIN/1.73M2 (ref 60–?)
GLOBULIN PLAS-MCNC: 4 G/DL (ref 2.8–4.4)
GLUCOSE BLD-MCNC: 110 MG/DL (ref 70–99)
GLUCOSE UR STRIP.AUTO-MCNC: NEGATIVE MG/DL
HCT VFR BLD AUTO: 38.8 %
HGB BLD-MCNC: 13.1 G/DL
IMM GRANULOCYTES # BLD AUTO: 0.02 X10(3) UL (ref 0–1)
IMM GRANULOCYTES NFR BLD: 0.3 %
KETONES UR STRIP.AUTO-MCNC: NEGATIVE MG/DL
LEUKOCYTE ESTERASE UR QL STRIP.AUTO: NEGATIVE
LYMPHOCYTES # BLD AUTO: 1.01 X10(3) UL (ref 1–4)
LYMPHOCYTES NFR BLD AUTO: 13.5 %
MCH RBC QN AUTO: 30.5 PG (ref 26–34)
MCHC RBC AUTO-ENTMCNC: 33.8 G/DL (ref 31–37)
MCV RBC AUTO: 90.2 FL
MDMA UR QL SCN: NEGATIVE
MONOCYTES # BLD AUTO: 0.94 X10(3) UL (ref 0.1–1)
MONOCYTES NFR BLD AUTO: 12.6 %
NEUTROPHILS # BLD AUTO: 5.21 X10 (3) UL (ref 1.5–7.7)
NEUTROPHILS # BLD AUTO: 5.21 X10(3) UL (ref 1.5–7.7)
NEUTROPHILS NFR BLD AUTO: 69.7 %
NITRITE UR QL STRIP.AUTO: NEGATIVE
OPIATES UR QL SCN: NEGATIVE
OSMOLALITY SERPL CALC.SUM OF ELEC: 297 MOSM/KG (ref 275–295)
OXYCODONE UR QL SCN: NEGATIVE
P AXIS: 36 DEGREES
P-R INTERVAL: 118 MS
PH UR STRIP.AUTO: 5 [PH] (ref 5–8)
PLATELET # BLD AUTO: 168 10(3)UL (ref 150–450)
POTASSIUM SERPL-SCNC: 4.3 MMOL/L (ref 3.5–5.1)
PROT SERPL-MCNC: 6.9 G/DL (ref 6.4–8.2)
PROT UR STRIP.AUTO-MCNC: 30 MG/DL
Q-T INTERVAL: 418 MS
QRS DURATION: 80 MS
QTC CALCULATION (BEZET): 447 MS
R AXIS: 50 DEGREES
RBC # BLD AUTO: 4.3 X10(6)UL
SALICYLATES SERPL-MCNC: <1.7 MG/DL (ref 2.8–20)
SARS-COV-2 RNA RESP QL NAA+PROBE: NOT DETECTED
SODIUM SERPL-SCNC: 138 MMOL/L (ref 136–145)
SP GR UR STRIP.AUTO: 1.01 (ref 1–1.03)
T AXIS: 66 DEGREES
UROBILINOGEN UR STRIP.AUTO-MCNC: <2 MG/DL
VENTRICULAR RATE: 69 BPM
WBC # BLD AUTO: 7.5 X10(3) UL (ref 4–11)

## 2023-01-02 PROCEDURE — 70450 CT HEAD/BRAIN W/O DYE: CPT | Performed by: EMERGENCY MEDICINE

## 2023-01-02 RX ORDER — HALOPERIDOL 5 MG/ML
2 INJECTION INTRAMUSCULAR ONCE
Status: DISCONTINUED | OUTPATIENT
Start: 2023-01-02 | End: 2023-01-03

## 2023-01-02 RX ORDER — LORAZEPAM 2 MG/ML
1 INJECTION INTRAMUSCULAR ONCE
Status: COMPLETED | OUTPATIENT
Start: 2023-01-02 | End: 2023-01-03

## 2023-01-02 NOTE — ED INITIAL ASSESSMENT (HPI)
Patient bib ems from snf for combativeness     Patient was newly admitted on the memory care unit at the facility on Saturday and has been refusing care and combative toward staff    Sent to ED for further evaluation w/ involuntary petition completed    RR even/NL, A&Ox2, denies any complaints at this time

## 2023-01-02 NOTE — ED QUICK NOTES
Per EMS attempted to de-escalate patient for >30min to place onto cart, needed to be placed in violent restraints    Patient currently refusing all care, refusing to change into hospital gown, clothes are completely soiled. Public safety at bedside patient placed in restraints to get undressed, clothing cut off and placed in belongings bag. Will obtain bloodwork.

## 2023-01-02 NOTE — ED PROVIDER NOTES
Patient Seen in: [facility] Emergency Department      History   Patient presents with:  Eval-P    Stated Complaint: psych eval and combativeness. refusing care- attempting to hit and kick staff/r*    Subjective:   HPI    60-year-old male with history of dementia presents for evaluation of combative behavior. Patient was just admitted to the dementia unit at [facility] on Saturday. He has been refusing care and attempting to hit and kick staff as well as other residents. Apparently it took 30 minutes to get the patient onto the gurney to bring him here for a psychiatric evaluation due to agitation and combativeness    Objective:   No pertinent past medical history. No pertinent past surgical history. No pertinent social history. Review of Systems    Positive for stated complaint: psych eval and combativeness. refusing care- attempting to hit and kick staff/r*  Other systems are as noted in HPI. Constitutional and vital signs reviewed. All other systems reviewed and negative except as noted above. Physical Exam     ED Triage Vitals [01/02/23 1445]   /88   Pulse 73   Resp 19   Temp    Temp src    SpO2 98 %   O2 Device None (Room air)       Current:/88   Pulse 73   Resp 19   SpO2 98%         Physical Exam    General: Alert, confused, easily agitated  HEENT: Atraumatic, normocephalic. Pupils equal reactive. Extraocular motions intact. Oropharynx clear. Neck: Supple  Lungs: Clear to auscultation bilaterally. Heart: Regular rate and rhythm. Abdomen: Soft, nontender. Skin: No rash. No edema. Neurologic: No focal neurologic deficits. Normal speech pattern  Musculoskeletal: No tenderness or deformity noted. Full range of motion throughout.         ED Course     Labs Reviewed   COMP METABOLIC PANEL (14) - Abnormal; Notable for the following components:       Result Value    Glucose 110 (*)     BUN 42 (*)     Creatinine 2.24 (*)     Calculated Osmolality 297 (*)     eGFR-Cr 30 (*)     AST 80 (*)     Albumin 2.9 (*)     A/G Ratio 0.7 (*)     All other components within normal limits   URINALYSIS WITH CULTURE REFLEX - Abnormal; Notable for the following components:    Blood Urine Small (*)     Protein Urine 30 (*)     Bacteria Urine Rare (*)     All other components within normal limits   ACETAMINOPHEN (TYLENOL), S - Abnormal; Notable for the following components:    Acetaminophen <2.0 (*)     All other components within normal limits   SALICYLATE, SERUM - Abnormal; Notable for the following components:    Salicylate <0.3 (*)     All other components within normal limits   CBC W/ DIFFERENTIAL - Abnormal; Notable for the following components:    HCT 38.8 (*)     All other components within normal limits   ETHYL ALCOHOL - Normal   SARS-COV-2 BY PCR (GENEXPERT) - Normal   CBC WITH DIFFERENTIAL WITH PLATELET    Narrative: The following orders were created for panel order CBC With Differential With Platelet. Procedure                               Abnormality         Status                     ---------                               -----------         ------                     CBC W/ DIFFERENTIAL[[id number]]          Abnormal            Final result                 Please view results for these tests on the individual orders. DRUG SCREEN 7 W/OUT CONFIRMATION, URINE    Narrative:     Results of the Urine Drug Screen should be used only for medical purposes. RAINBOW DRAW BLUE     EKG    Rate, intervals and axes as noted on EKG Report. Rate: 69  Rhythm: Sinus Rhythm  Reading: No acute ST elevation or depression                    MDM          Medications   haloperidol lactate (Haldol) 5 MG/ML injection 2 mg (0 mg Intravenous Hold 1/2/23 1451)   LORazepam (Ativan) 2 mg/mL injection 1 mg (0 mg Intravenous Hold 1/2/23 1451)   sodium chloride 0.9 % IV bolus 1,000 mL (0 mL Intravenous Stopped 1/2/23 1722)     Petition and certificate on chart.     Labs with slight renal insufficiency, may be dehydration versus new chronic kidney disease. Patient given IV fluids. Last creatinine 1.3 about 1 year ago      CT BRAIN OR HEAD (78399)    Result Date: 1/2/2023  PROCEDURE:  CT BRAIN OR HEAD (52124)  LOCATION:  [id number]  COMPARISON:  MR BHUPINDER, MRI BRAIN (CPT=70551), 1/23/2020, 6:28 PM.  INDICATIONS:  psych eval and combativeness. refusing care- attempting to hit and kick staff/residents started all weekend. progressively getting worse. took 35 mins to get hi, mental status changes  TECHNIQUE:  Noncontrast CT scanning is performed through the brain. Dose reduction techniques were used. Dose information is transmitted to the [facility] of [facility]) NRDR ([facility] [address]) which includes the Dose Index Registry. PATIENT STATED HISTORY: (As transcribed by Technologist)  Patient with altered mental status. FINDINGS:  VENTRICLES/SULCI:  Again noted is moderate atrophy with prominence of the ventricles. 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 There is a background of severe chronic deep white matter ischemic change. No acute territorial infarction. No hemorrhage or midline shift. The basal cisterns are widely patent. There is vascular calcification  at the skull base which is mild for age. INTRACRANIAL:  There are no abnormal extraaxial fluid collections. There is no midline shift. There are no intraparenchymal brain abnormalities. There is nothing specific for acute infarct. There is no hemorrhage or mass lesion. SINUSES:           No sign of acute sinusitis. MASTOIDS:          No sign of acute inflammation. SKULL:             No evidence for fracture or osseous abnormality. OTHER:             None. CONCLUSION:  Stable severe chronic changes as described above. No acute disease. Dictated by (CST): Oneal Dodge MD on 1/02/2023 at 7:14 PM     Finalized by (CST): Oneal Dodge MD on 1/02/2023 at 7:16 PM                              Medical Decision Making  Agitation due to dementia: acute illness or injury      Disposition and Plan     Clinical Impression:  Agitation due to dementia  (primary encounter diagnosis)  Renal insufficiency     Disposition:  Psychiatric transfer  1/2/2023  6:58 pm    Follow-up:  No follow-up provider specified.         Medications Prescribed:  Current Discharge Medication List

## 2023-01-02 NOTE — ED NOTES
Completed rights with Judith Newell as witness. Activated petition and sent to [facility]. Provided copies, scanned into chart.

## 2023-01-03 PROCEDURE — 90792 PSYCH DIAG EVAL W/MED SRVCS: CPT

## 2023-01-03 RX ORDER — QUETIAPINE FUMARATE 25 MG/1
12.5 TABLET, FILM COATED ORAL 3 TIMES DAILY PRN
Status: DISCONTINUED | OUTPATIENT
Start: 2023-01-03 | End: 2023-01-04

## 2023-01-03 RX ORDER — HALOPERIDOL 5 MG/ML
2 INJECTION INTRAMUSCULAR ONCE
Status: COMPLETED | OUTPATIENT
Start: 2023-01-03 | End: 2023-01-03

## 2023-01-03 RX ORDER — LORAZEPAM 2 MG/ML
1 INJECTION INTRAMUSCULAR ONCE
Status: DISCONTINUED | OUTPATIENT
Start: 2023-01-03 | End: 2023-01-04

## 2023-01-03 RX ORDER — LORAZEPAM 2 MG/ML
1 INJECTION INTRAMUSCULAR ONCE
Status: COMPLETED | OUTPATIENT
Start: 2023-01-03 | End: 2023-01-03

## 2023-01-03 RX ORDER — QUETIAPINE FUMARATE 25 MG/1
25 TABLET, FILM COATED ORAL NIGHTLY
Status: DISCONTINUED | OUTPATIENT
Start: 2023-01-03 | End: 2023-01-04

## 2023-01-03 NOTE — ED NOTES
This writer contacted patients wife to provide an update on plan on care. Patients wife prefers [facility] but is willing to consent to [address] [place] [address]. Patients wife is willing to consent to expanding search to [address]. Patients wife was informed that patients packet is being reviewed by [facility].

## 2023-01-03 NOTE — ED NOTES
This writer contacted [facility] to follow up on patients referral.    This writer left a voicemail message.

## 2023-01-03 NOTE — ED NOTES
This writer received an incoming phone call from [facility].  This writer confirmed that patient is still in need of a bed. [facility] does not have any beds today but they will review patient for tomorrow. Patients packet is under review.

## 2023-01-03 NOTE — ED QUICK NOTES
Pt incontinent of urine, small BM noted. He is kept clean and dry. New adult brief, new gown, new sheets applied.

## 2023-01-03 NOTE — ED NOTES
TRANSFER SUMMARY:  [facility]: Faxed packet for review. [facility] ([facility]urt[facility]): Faxed packet for review. NO AVAILABLE BEDS:  [facility]: Left a voicemail message with . [facility]: No appropriate bed  [facility] (pt's wife wants to try [facility] first): No available beds d/t moving patients on unit. [facility]: No available beds. [place]: No available [facility] beds. [facility]:  Doesn't accept dementia patients. [facility]: Unable to take referral d/t unit full. HMO LIST EXHAUSTED:  [facility]: No appropriate bed. [facility]: No available male [facility] beds. OUT OF NETWORK:  [facility]: Patients insurance is out of network. [facility]: Patients insurance is out of network with [address] [place] [address]. Patients insurance is only accepted at [facility]; which  Requires a 1:1. No beds available beds. [facility]: Patients insurance is out network.

## 2023-01-03 NOTE — ED QUICK NOTES
Patient transferred to B POD and report given to RN. No update on placement.  Patient remains calm and cooperative

## 2023-01-03 NOTE — ED QUICK NOTES
Report from Chago Flannery RN. Pt awake in bed. He is alert, oriented to self only, replies, \"I'm in [place]. \" when asked. Pt in no acute distress. He denies pain.  Pt calm, cooperative at this time

## 2023-01-03 NOTE — ED QUICK NOTES
Patient becoming agitated, attempting to get out of bed to [facility] home\". Rec'd order from psych to admin 1mg PRN ativan to patient.

## 2023-01-03 NOTE — ED NOTES
This writer received an incoming phone call from Ashley of [facility].  [facility] is reviewing patients packet for admission tonight. [facility] will need an updated petition & cerftificate. Patients packet is under review.

## 2023-01-03 NOTE — ED NOTES
This writer contacted patients wife to provide an update on plan of care at home number but there was no answer.

## 2023-01-03 NOTE — ED NOTES
This writer contacted patients wife, who is POA, to provide an update on plan of care. Patients prefers [facility] but wants to try [facility] first.  If [facility] doesn't have beds; patients wife prefers [facility]. Patients daughter has concerns that patient needs neurology; which can be done at [facility]. Patients wife will follow up on her own with her insurance regarding coverage. This writer notified Maria Alejandra Harvey.

## 2023-01-03 NOTE — ED QUICK NOTES
Pt yelling nurse, officer, \"I want to use the bathroom. \" Pt incontinent of urine. Assisted by 2 staffs to stand and sit on the chair next to his bed. Pt gait very wobbly and unsteady. He is kept clean and dry. New adult brief applied.

## 2023-01-03 NOTE — ED NOTES
This writer contacted [employer] to obtain a status regarding patients referral.  This writer spoke with Natalie Hobson leaving a message. She will have her supervisor call this writer back.

## 2023-01-03 NOTE — ED NOTES
Rights and Voluntary ppw completed with pt's POA/spouse and scanned into pt chart. Pt copies provided on chart due to pt's mental status.

## 2023-01-03 NOTE — ED NOTES
This writer received an incoming phone call from [facility].    No appropriate bed for patient.

## 2023-01-03 NOTE — ED NOTES
Per ED Registration Patients Medical group is [id number].     VISH[facility] confirmed that patient is in network.

## 2023-01-03 NOTE — ED NOTES
This writer contacted [facility] to check the status of patients referral.    Chelsy Demarco will follow up with bed charge to see if they have had a chance to review patients packet. [place] asked to call this writer back.

## 2023-01-04 VITALS
SYSTOLIC BLOOD PRESSURE: 110 MMHG | RESPIRATION RATE: 18 BRPM | OXYGEN SATURATION: 98 % | HEIGHT: 74 IN | DIASTOLIC BLOOD PRESSURE: 66 MMHG | BODY MASS INDEX: 25.67 KG/M2 | TEMPERATURE: 98 F | WEIGHT: 200 LBS | HEART RATE: 77 BPM

## 2023-01-04 LAB
ATRIAL RATE: 71 BPM
P AXIS: 28 DEGREES
P-R INTERVAL: 110 MS
Q-T INTERVAL: 406 MS
QRS DURATION: 72 MS
QTC CALCULATION (BEZET): 441 MS
R AXIS: 27 DEGREES
T AXIS: 57 DEGREES
VENTRICULAR RATE: 71 BPM

## 2023-01-04 RX ORDER — LOSARTAN POTASSIUM 100 MG/1
300 TABLET ORAL DAILY
Status: DISCONTINUED | OUTPATIENT
Start: 2023-01-04 | End: 2023-01-04

## 2023-01-04 RX ORDER — SERTRALINE HYDROCHLORIDE 100 MG/1
100 TABLET, FILM COATED ORAL EVERY EVENING
COMMUNITY
Start: 2022-11-28

## 2023-01-04 RX ORDER — ALPRAZOLAM 0.25 MG/1
0.25 TABLET ORAL
COMMUNITY
Start: 2022-12-30 | End: 2023-01-13

## 2023-01-04 RX ORDER — ASPIRIN 81 MG/1
325 TABLET, CHEWABLE ORAL ONCE
Status: COMPLETED | OUTPATIENT
Start: 2023-01-04 | End: 2023-01-04

## 2023-01-04 NOTE — ED QUICK NOTES
Pt cristy marquez at bedside asking for help due to pt awaking from sleep agitated and ripping out own IV catheter. Pt attempting to get out of bed and fighting with staff, uncooperative and agitated, not redirectable despite multiple attempts. ED physician aware of escalation of behavior. Orders to change medication to IM route.

## 2023-01-04 NOTE — ED QUICK NOTES
Patient has been accepted to [facility] in [place]. Awaiting call-back to give nurse-to-nurse report.

## 2023-01-04 NOTE — ED QUICK NOTES
The [facility] has called back, unsure if they have a bed for patient. Will call back later to confirm.

## 2023-01-04 NOTE — ED NOTES
This writer scanned copy of patients passport insurance into his chart. This writer discussed with registration that patients wife informed to bring a copy of his card. Patients wife will bring a copy of his card today.

## 2023-01-04 NOTE — ED NOTES
This writer received placed an outgoing phone call to [facility] to provide patients weigh tof 200 lbs. This writer also faxed a copy of his vitals. Meredith will call back with the phone number for nurse to nurse.

## 2023-01-04 NOTE — ED NOTES
TRANSFER SUMMARY:  [facility] COMMUNITY: Faxed packet for review. NEUROPSYCHIATRIC: Faxed packet for review. NO AVAILABLE BEDS/DEFLECTED:  [facility] (INSURANCE ONLY ACCEPTED AT [facility]): Patient deflected d/t requiring a 1:1.  [facility]: No availaR[facility]ERS[facility]: Unit full d/t ED patients taking bedsble beds. [facility]AL:  Doesn't accept dementia patients. HMO LIST EXHAUSTED:  [facility]: No appropriate bed. [facility]: No available male SANTANA beds. OUT OF NETWORK:  [facility]UDE NAVARRO: Patients insurance is out network. JUDE GONZALES [place]: Patients insurance is out of network. SCALES: Patient deflected d/t insurance. Doctor not willing to accept.

## 2023-01-04 NOTE — ED QUICK NOTES
Wife (POA) of patient and daughter of patient give this RN verbal permissio to transfer patient to [address] [place] [address] in [facility].

## 2023-01-04 NOTE — ED PROVIDER NOTES
Patient has been accepted to the [facility] in [address] [place] [address] for further evaluation.

## 2023-01-04 NOTE — ED QUICK NOTES
Pt continues to sleep in ED stretcher. Allowing staff to provide fresh gown and linens at this time due to pt being soiled in stool and urine. Fresh chucks pad applied under pt. Warm blankets and pillow given.

## 2023-01-04 NOTE — ED NOTES
This writer contacted Critical access hospital about status of patients referral.  Critical access hospital is not sure if a male bed is available. Patient is under review.

## 2023-01-04 NOTE — ED QUICK NOTES
Attempting to help pt with urinal due to pt urinating all over self and ED stretcher. Pt took full urinal and threw it at RN and other staff in room.

## 2023-01-04 NOTE — ED NOTES
This writer placed an outgoing phone call to [facility].  She did confirm that a bed is available at their [facility] location. She will follow up with Administration to determine if they can take patient. Patients' packet is under review.

## 2023-01-04 NOTE — ED NOTES
This writer cancelled patients referral for [facility] d/t acceptance to [facility].

## 2023-01-04 NOTE — ED NOTES
This writer contacted patients wife to provide an update on plan of care. This writer informed patients wife that patients packet is under review with [facility] in [place]. This writer informed patients wife asked that we retry [facility].      Patients will consent to [facility] in [place]. She is agreeable to expanding search.

## 2023-01-04 NOTE — ED QUICK NOTES
Pt stirring on cart, found incontinent of urine. Tatyana care provided, dry linen and diaper with brief applied. Warm blankets given. Sips of water offered and provided to patient.

## 2023-01-04 NOTE — ED NOTES
This writer received an incoming phone call from [facility].  Patient accepted by Dr. Claire Jackson. [facility] is requesting a weight before patient is sent. They will call us for nurse to nurse. This writer informed Maria Alejandra Harvey.

## 2023-01-04 NOTE — ED NOTES
This writer received an incoming phone call from [facility].  There is an available bed at their [place], [place] location. This writer confirmed that patient still requires a bed. This writer was transferred to [facility] and [facility].  Phone call then disconnected.

## 2023-01-04 NOTE — ED NOTES
This writer contacted [address] [place] [address] to check the status of patients referral.  [facility] has received patients referral.  This writer informed them that patients referral should be coming through the fax machine soon. Patients packet is under review.

## 2023-01-04 NOTE — ED QUICK NOTES
This RN, SHARON Little, bharti Plascencia and security x2 at bedside to assist with medicating pt. Pt not responding to redirection by staff, Attempting to hit and kick staff while trying to get out of bed. Able to medicate pt without incident via intramuscular route.

## 2024-02-29 NOTE — ED NOTES
Dinner tray ordered for patient.
MRI gives ETA 1930
normal/clear to auscultation bilaterally/no wheezes/no rales/no rhonchi

## (undated) NOTE — IP AVS SNAPSHOT
Patient Demographics     Address  [address] [place] [id number] Phone  [phone] [facility])  [phone] (Mobile) *Preferred*      Emergency Contact(s)     Name Relation Home Work Mobile    Angelique Lynch Spouse [phone]     La Nena Davis [id number] - MAR ACTION REPORT  (last 24 hrs)    ** SITE UNKNOWN **     Order ID Medication Name Action Time Action Reason Comments    [id number] Sertraline HCl (ZOLOFT) tab 50 mg 01/29/20 2011 Given      [id number] aspirin EC tab 81 mg 01/30/20 0846 Given HTN, melanoma, and depression who presented to the ED c/o progressive worsening gait over the past two weeks. Family was concerned about his fall risk. No chest pain, SOB, aphasia, or new visual changes.       In the ED, MRI brain neg for acute infarct, s • NASAL FLAP THINNING AND SCULPTING Left 8/15/2013    Performed by Merrill Mensah MD at [facility]   • OTHER SURGICAL HISTORY      achilles tendon and skin graft ankle in high school   • SKIN SURGERY  5-1-13    BCC-nodular to left superior Pulm: Lungs clear bilaterally, normal respiratory effort  CV: Heart with regular rate and rhythm, no murmur. Normal PMI.     Abd: Abdomen soft, nontender, nondistended, no organomegaly, bowel sounds present  MSK: Full range of motion in extremities, no clu ventricle. Expected signal is seen in the visualized paranasal sinuses and mastoid air cells. CONCLUSION:  1. 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 2. Moderate chronic small vessel disease. 3. Negative for acute infarct.  4. Continued clinical co Consults signed by Anant Winslow MD at 1/25/2020  5:47 PM     Author:  Anant Winslow MD Service:  Rheumatology Author Type:  Physician    Filed:  1/25/2020  5:47 PM Date of Service:  1/25/2020  5:36 PM Status:  Signed    :  Anant Winslow MD (Physi • Unspecified essential hypertension    • [facility] 10/9/2019          PSH:        Past Surgical History:   Procedure Laterality Date   • COLONOSCOPY  06/05/2019    2 transverse colon polyps, diverticulosis   • • Heart Disorder Father         unknown heart disorder -  in his 45s. • Other (Other) Mother         penumonia   • Hypertension Sister    • Other (Other) Brother         spina bifida     EXAM:   /73 (BP Location: Left arm)   Pulse 61   Temp 97. 4. Continued clinical correlation recommended        Assessment/Plan: 69year old male with a PMH of stroke with left sided weakness, vascular dementia, HTN, major depression and melanoma in situ who was admitted for worsening gait instability.   He was see Discharged Condition: good    Exam: (see progress notes for full details)  General: alert and oriented, NAD  HEENT: sclera anicteric, oral mucosa moist  Pulm: CTAB, no wheezing  Cv: RRR, no murmur or rub  Abdomen: + Bs, soft NTND, no HSM  Ext: warm to touc Total Time Coordinating Care: Greater than 30 minutes    Patient had opportunity to ask questions and state understand and agree with therapeutic plan as outlined above. Thank Tristin Sorto MD  [facility] Hospitalist[SB. 1]    Addendum    Patient did # Unsteady Gait  - MRI brain negative, for acute infarct  - Neuro c/s  -- recommending EMG to be done as an outpatient  - PT and PMR --> ODETTE at d/c    # Positive KENAN  - appreciate rheum recs -- do not think myositis or other autoimmune etiology    # Hx CVA Author:  Grupo Alonso PTA Service:  Rehab Author Type:  Physical Therapy Assistant    Filed:  1/29/2020  2:41 PM Date of Service:  1/29/2020  2:13 PM Status:  Signed    :  Grupo Alonso PTA (Physical Therapy Assistant)        PHYSICAL T Performed by Fareed Belcher MD at [address] [facility] AND SCULPTING N/A 10/1/2013    Performed by Fareed Belcher MD at [facility]   • NASAL FLAP THINNING AND SCULPTING Left 8/15/2013    Performed by Bernadine Favre, Raw Score: 17   Approx Degree of Impairment: 50.57%   Standardized Score (AM-PAC Scale): 42.13   CMS Modifier (G-Code): CK[BR.3]    FUNCTIONAL ABILITY STATUS  Gait Assessment[BR.1]   Gait Assistance: Minimum assistance  Distance (ft): 100  Assistive Device assist and cues during transfers, balance training and gait training. Pt is very cooperative, participates well. 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 \" pt re Comment : Score based on FIM definitions per department protocol. [BR.2]    Skilled Therapy Provided: The pt participated in coordination training of LLE, strength training with dat le's, balance training to improve dynamic balance, transfer training and ga PT Discharge Recommendations: Sub-acute rehabilitation[BR.2]     PLAN[BR.1]  PT Treatment Plan: Bed mobility; Coordination; Endurance; Energy conservation;Patient education; Family education;Gait training;Neuromuscular re-educate;Stair training;Transfer traini psa  1/07 0.8; 6/05 0.7   • Mild single current episode of major depressive disorder ([facility] 2/28/2018   • Other and unspecified hyperlipidemia    • STROKE 1/11    loss of left peripheral vision   • Unspecified essential hypertension    • Vascular dementia BALANCE                                                                                                                     Static Sitting: Good  Dynamic Sitting: Fair           Static Standing: Fair -  Dynamic Standing: Poor +    ACTIVITY TOLERANCE During standing, pt worked on balance ex with wt shift side to side, side stepping to left. Marching in standing. Coordination ex with lle to touch three target points. Heel to shin glide with left le.     THERAPEUTIC EXERCISES  Lower Extremity Alternating BR.1 - Maru Henson PTA on 1/27/2020  2:48 PM                        Occupational Therapy Notes (last 72 hours) (Notes from 1/27/2020 11:28 AM through 1/30/2020 11:28 AM)      Occupational Therapy Note signed by Jamison Powers OT at 1/29/2020 1 • Unspecified essential hypertension    • Vascular dementia without behavioral disturbance ([facility] 10/9/2019       Past Surgical History  Past Surgical History:   Procedure Laterality Date   • COLONOSCOPY  06/05/2019    2 transverse colon polyps, diverticulo -   Putting on and taking off regular lower body clothing?: A Lot  -   Bathing (including washing, rinsing, drying)?: A Little  -   Toileting, which includes using toilet, bedpan or urinal? : A Little  -   Putting on and taking off regular upper body cloth Continue to recommend ODETTE. Pt will benefit from skilled OT services to maximize independence with ADLs.         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 CGA for balance when pt was buttoning it. CGA to remove the jeans off. Fine motor intact B hands. Chair alarm on. Patient End of Session: Up in chair;Needs met;Call light within reach; All patient questions and concerns addressed; Alarm set VERONIQUE WOLF WTN MOB      Multidisciplinary Problems     Active Goals        Problem: Patient/Family Goals    Goal Priority Disciplines Outcome Interventions   Patient/Family Long Term Goal     Interdisciplinary Adequate for Discharge    Description:  [place]

## (undated) NOTE — IP AVS SNAPSHOT
[address]            (For Outpatient Use Only) Initial Admit Date: 1/23/2020   Inpt/Obs Admit Date: Inpt: N/A / Obs: 01/23/20   Discharge Date:    Dave Pak:  [de-identified]   MRN: [de-identified]   CSN: [id number]   CEID: [id number] Hospital Account Financial Class: Medicare Advantage    January 30, 2020